# Patient Record
Sex: FEMALE | Race: WHITE
[De-identification: names, ages, dates, MRNs, and addresses within clinical notes are randomized per-mention and may not be internally consistent; named-entity substitution may affect disease eponyms.]

---

## 2018-12-21 ENCOUNTER — HOSPITAL ENCOUNTER (OUTPATIENT)
Dept: HOSPITAL 75 - RAD | Age: 58
End: 2018-12-21
Attending: OBSTETRICS & GYNECOLOGY
Payer: MEDICAID

## 2018-12-21 DIAGNOSIS — R10.2: ICD-10-CM

## 2018-12-21 DIAGNOSIS — Z90.710: ICD-10-CM

## 2018-12-21 DIAGNOSIS — G89.29: Primary | ICD-10-CM

## 2018-12-21 PROCEDURE — 76856 US EXAM PELVIC COMPLETE: CPT

## 2018-12-21 PROCEDURE — 76830 TRANSVAGINAL US NON-OB: CPT

## 2018-12-21 NOTE — DIAGNOSTIC IMAGING REPORT
PROCEDURE: US Non-ob pelvis comp/trans.



TECHNIQUE:  Multiple realtime grayscale images were obtained of

the pelvis in various projections endovaginally.



Transabdominal imaging was also performed.



INDICATION: Chronic pelvic pain. Patient status post

hysterectomy.



FINDINGS: The uterus is surgically absent. Neither ovary was

visualized. No adnexal mass or free fluid is identified.



IMPRESSION: Status post hysterectomy. The ovaries could not be

visualized. No adnexal mass is detected.



Dictated by: 



  Dictated on workstation # [id number]

## 2019-01-29 ENCOUNTER — HOSPITAL ENCOUNTER (OUTPATIENT)
Dept: HOSPITAL 75 - RAD | Age: 59
End: 2019-01-29
Attending: NURSE PRACTITIONER
Payer: MEDICAID

## 2019-01-29 DIAGNOSIS — K57.30: Primary | ICD-10-CM

## 2019-01-29 DIAGNOSIS — R16.0: ICD-10-CM

## 2019-01-29 DIAGNOSIS — Z90.49: ICD-10-CM

## 2019-01-29 PROCEDURE — 74160 CT ABDOMEN W/CONTRAST: CPT

## 2019-01-29 NOTE — DIAGNOSTIC IMAGING REPORT
PROCEDURE: CT abdomen with contrast only.



TECHNIQUE: Multiple contiguous axial images were obtained through

the abdomen after the administration of intravenous contrast.



INDICATION:  Epigastric pain.



COMPARISON: There are no prior studies available for comparison. 



FINDINGS: The stomach is filled with oral contrast and the

gastric antrum is distended by gas. There is no obvious gastric

abnormality evident. 



The liver is borderline enlarged and of lower density than

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 There is no

focal mass involving the liver and the biliary tree is not

abnormally dilated. The gallbladder is surgically absent.



The spleen, pancreas, adrenals, kidneys, aorta and inferior vena

cava show no sign of an acute abnormality.



There are a few fluid and gas-filled segments of small bowel in

the left midabdomen. This appearance is nonspecific. There is no

sign of a bowel obstruction. 



There is diverticulosis of the descending colon but there is no

clear evidence for acute diverticulitis. However, if there is

clinical concern regarding diverticulosis of the sigmoid colon,

CT of the pelvis should be obtained for further study. 



The bone windows show no evidence for fracture or for destructive

lesion.



IMPRESSION:

1. There is no acute abnormality of the abdomen.

2. There is diverticulosis of the descending colon but there is

no sign of acute diverticulitis. However, if there is clinical

concern regarding diverticulitis of the sigmoid colon, then CT of

the pelvis would be recommended for further study.

3. There is borderline hepatomegaly and the appearance of the

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

4. The gallbladder is surgically absent. 















Dictated by: 



  Dictated on workstation # [id number]

## 2019-02-26 ENCOUNTER — HOSPITAL ENCOUNTER (OUTPATIENT)
Dept: HOSPITAL 75 - CARD | Age: 59
End: 2019-02-26
Attending: NURSE PRACTITIONER
Payer: MEDICAID

## 2019-02-26 VITALS — SYSTOLIC BLOOD PRESSURE: 180 MMHG | DIASTOLIC BLOOD PRESSURE: 95 MMHG

## 2019-02-26 VITALS — DIASTOLIC BLOOD PRESSURE: 98 MMHG | SYSTOLIC BLOOD PRESSURE: 177 MMHG

## 2019-02-26 DIAGNOSIS — R00.2: ICD-10-CM

## 2019-02-26 DIAGNOSIS — I10: ICD-10-CM

## 2019-02-26 DIAGNOSIS — R07.89: Primary | ICD-10-CM

## 2019-02-26 DIAGNOSIS — R06.09: ICD-10-CM

## 2019-02-26 PROCEDURE — 93306 TTE W/DOPPLER COMPLETE: CPT

## 2019-03-05 ENCOUNTER — HOSPITAL ENCOUNTER (OUTPATIENT)
Dept: HOSPITAL 75 - RAD | Age: 59
End: 2019-03-05
Attending: NURSE PRACTITIONER
Payer: MEDICAID

## 2019-03-05 DIAGNOSIS — I73.9: Primary | ICD-10-CM

## 2019-03-05 PROCEDURE — 93923 UPR/LXTR ART STDY 3+ LVLS: CPT

## 2019-03-12 ENCOUNTER — HOSPITAL ENCOUNTER (OUTPATIENT)
Dept: HOSPITAL 75 - CATH | Age: 59
Discharge: HOME | End: 2019-03-12
Attending: INTERNAL MEDICINE
Payer: MEDICAID

## 2019-03-12 VITALS — WEIGHT: 222 LBS | HEIGHT: 66 IN | BODY MASS INDEX: 35.68 KG/M2

## 2019-03-12 VITALS — DIASTOLIC BLOOD PRESSURE: 85 MMHG | SYSTOLIC BLOOD PRESSURE: 106 MMHG

## 2019-03-12 VITALS — DIASTOLIC BLOOD PRESSURE: 73 MMHG | SYSTOLIC BLOOD PRESSURE: 127 MMHG

## 2019-03-12 VITALS — SYSTOLIC BLOOD PRESSURE: 125 MMHG | DIASTOLIC BLOOD PRESSURE: 70 MMHG

## 2019-03-12 VITALS — DIASTOLIC BLOOD PRESSURE: 73 MMHG | SYSTOLIC BLOOD PRESSURE: 143 MMHG

## 2019-03-12 VITALS — DIASTOLIC BLOOD PRESSURE: 77 MMHG | SYSTOLIC BLOOD PRESSURE: 125 MMHG

## 2019-03-12 VITALS — DIASTOLIC BLOOD PRESSURE: 62 MMHG | SYSTOLIC BLOOD PRESSURE: 122 MMHG

## 2019-03-12 VITALS — SYSTOLIC BLOOD PRESSURE: 133 MMHG | DIASTOLIC BLOOD PRESSURE: 70 MMHG

## 2019-03-12 VITALS — DIASTOLIC BLOOD PRESSURE: 92 MMHG | SYSTOLIC BLOOD PRESSURE: 155 MMHG

## 2019-03-12 VITALS — DIASTOLIC BLOOD PRESSURE: 74 MMHG | SYSTOLIC BLOOD PRESSURE: 114 MMHG

## 2019-03-12 VITALS — DIASTOLIC BLOOD PRESSURE: 70 MMHG | SYSTOLIC BLOOD PRESSURE: 138 MMHG

## 2019-03-12 DIAGNOSIS — M79.605: ICD-10-CM

## 2019-03-12 DIAGNOSIS — E66.9: ICD-10-CM

## 2019-03-12 DIAGNOSIS — G47.33: ICD-10-CM

## 2019-03-12 DIAGNOSIS — R07.89: Primary | ICD-10-CM

## 2019-03-12 DIAGNOSIS — K44.9: ICD-10-CM

## 2019-03-12 DIAGNOSIS — Z79.82: ICD-10-CM

## 2019-03-12 DIAGNOSIS — K21.9: ICD-10-CM

## 2019-03-12 DIAGNOSIS — R94.39: ICD-10-CM

## 2019-03-12 DIAGNOSIS — I10: ICD-10-CM

## 2019-03-12 DIAGNOSIS — E11.9: ICD-10-CM

## 2019-03-12 DIAGNOSIS — E78.5: ICD-10-CM

## 2019-03-12 DIAGNOSIS — Z79.899: ICD-10-CM

## 2019-03-12 LAB
ALBUMIN SERPL-MCNC: 4.3 GM/DL (ref 3.2–4.5)
ALP SERPL-CCNC: 52 U/L (ref 40–136)
ALT SERPL-CCNC: 36 U/L (ref 0–55)
APTT BLD: 33 SEC (ref 24–35)
BILIRUB SERPL-MCNC: 0.6 MG/DL (ref 0.1–1)
BUN/CREAT SERPL: 17
CALCIUM SERPL-MCNC: 9.3 MG/DL (ref 8.5–10.1)
CHLORIDE SERPL-SCNC: 109 MMOL/L (ref 98–107)
CHOLEST SERPL-MCNC: 124 MG/DL (ref ?–200)
CO2 SERPL-SCNC: 23 MMOL/L (ref 21–32)
CREAT SERPL-MCNC: 0.99 MG/DL (ref 0.6–1.3)
ERYTHROCYTE [DISTWIDTH] IN BLOOD BY AUTOMATED COUNT: 13.5 % (ref 10–14.5)
GFR SERPLBLD BASED ON 1.73 SQ M-ARVRAT: 58 ML/MIN
GLUCOSE SERPL-MCNC: 108 MG/DL (ref 70–105)
HCT VFR BLD CALC: 41 % (ref 35–52)
HDLC SERPL-MCNC: 32 MG/DL (ref 40–60)
HGB BLD-MCNC: 13.9 G/DL (ref 11.5–16)
INR PPP: 1 (ref 0.8–1.4)
MCH RBC QN AUTO: 29 PG (ref 25–34)
MCHC RBC AUTO-ENTMCNC: 34 G/DL (ref 32–36)
MCV RBC AUTO: 86 FL (ref 80–99)
PLATELET # BLD: 297 10^3/UL (ref 130–400)
PMV BLD AUTO: 10.2 FL (ref 7.4–10.4)
POTASSIUM SERPL-SCNC: 4.1 MMOL/L (ref 3.6–5)
PROT SERPL-MCNC: 6.7 GM/DL (ref 6.4–8.2)
PROTHROMBIN TIME: 12.7 SEC (ref 12.2–14.7)
SODIUM SERPL-SCNC: 142 MMOL/L (ref 135–145)
TRIGL SERPL-MCNC: 122 MG/DL (ref ?–150)
VLDLC SERPL CALC-MCNC: 24 MG/DL (ref 5–40)
WBC # BLD AUTO: 7.4 10^3/UL (ref 4.3–11)

## 2019-03-12 PROCEDURE — 36415 COLL VENOUS BLD VENIPUNCTURE: CPT

## 2019-03-12 PROCEDURE — 80053 COMPREHEN METABOLIC PANEL: CPT

## 2019-03-12 PROCEDURE — 85610 PROTHROMBIN TIME: CPT

## 2019-03-12 PROCEDURE — 80061 LIPID PANEL: CPT

## 2019-03-12 PROCEDURE — 87081 CULTURE SCREEN ONLY: CPT

## 2019-03-12 PROCEDURE — 93458 L HRT ARTERY/VENTRICLE ANGIO: CPT

## 2019-03-12 PROCEDURE — 85730 THROMBOPLASTIN TIME PARTIAL: CPT

## 2019-03-12 PROCEDURE — 85027 COMPLETE CBC AUTOMATED: CPT

## 2019-03-12 PROCEDURE — 93005 ELECTROCARDIOGRAM TRACING: CPT

## 2019-03-12 NOTE — CARDIAC PROCEDURE NOTE-CS/ASA
Pre-Procedure Note


Pre-Op Procedure Note


H&P Reviewed


The H&P was reviewed, patient examined and no changes noted.


Date H&P Reviewed:  Mar 12, 2019


Time H&P Reviewed:  13:09





Conscious Sedation Pre-Proced


Time


13:09





ASA Score


3


For ASA 3 and 4: Consider anesthesia and medical clearance. Also, for 

patients with a history of failed moderate sedation consider anesthesia.

















Airway 


 


Lungs 


 


Heart 


 


 ASA score


 


 ASA 1: a normal healthy patient


 


 ASA 2:  a patient with a mild systemic disease (mid diabetes, controlled 

hypertension, obesity 


 


 ASA 3:  a patient with a severe systemic disease that limits activity  (angina

, COPD, prior Myocardial infarction)


 


 ASA 4:  a patient with an incapacitating disease that is a constant threat to 

life (CHF, renal failure)


 


 ASA 5:  a moribund patient not expected to survive 24 hrs.  (ruptured aneurysm)


 


 ASA 6:  a declared brain-dead patient whose organs are being harvested.


 


 For emergent operations, add the letter E after the classification











Mallampati Classification


Grade 2





Sedation Plan


Analgesia, Amnesia, Plan communicated to team members, Discussed options with 

patient/fam, Discussed risks with patient/fam


The patient is an appropriate candidate to undergo the planned procedure, 

sedation, and anesthesia.





The patient immediately re-assessed prior to indication.











JUJU KING MD FACP FAC CCDS Mar 12, 2019 13:09

## 2019-03-12 NOTE — DISCHARGE INST-POST CATH
Discharge Inst-CATH/EP


Post Cardiac Cath/EP D/C Inst


Follow Up/Plan


F/u with Dr Zepeda in 2-3 weeks


CARDIAC CATH DISCHARGE INSTRUCTIONS





*Hold Metformin for 48 hours post heart cath.





ACTIVITY





* Go Home directly and rest.


* Limit activity of the leg (or wrist if it was used) for 7 days including 

aerobics, swimming,


   jogging, bicycling, etc.


* Restrict stair-climbing for 7 days if possible, if not, climb up with your non

-cath leg, then


   bring together on the same step.


* Avoid lifting, pushing, pulling or excessive movement of the affected 

extremity for 7 days.


* Customary sexual activity may be resumed after 2 days-use caution not to use 

a position  


   that strains or causes pain to the affected extremity.


* No driving for 24 hours.


* NO SMOKING. 


* Avoid straining for bowel movements for 7 days.


* Gentle walking on level ground is allowed.


* Returning to work will depend on the type of procedure and the results. Your 

doctor will discuss


   this with you.





CALL YOUR DOCTOR FOR ANY OF THE FOLLOWING:





*If bleeding from the puncture site occurs- Apply gentle pressure to site with 

clean cloth and call


   your doctor or EMS.


* If a knot or lump forms under the skin, increases in size, or causes pain.


* If bruising appears to be worsening or moving further down your leg instead 

of disappearing.


* Temperature above 101 F.





CARE OF YOUR GROIN INCISION;





* Bruising or purple discoloration of the skin near the puncture site is common.


* You may shower only, no bathtub bathing for 5 days.  Be careful to avoid 

slipping as your


   leg may feel stiff.


* If a closure device was used on your femoral artery, please see the attached 

guide regarding


   care of the device and your leg.


* Leave the dressing on, until removed by office staff.





CARE OF YOUR WRIST INCISION;





* Bruising or purple discoloration of the skin near the puncture site is common.


* You may shower.


* DO NOT submerge wrist.


* Leave dressing on, until removed by office staff..











JUJU ZEPEDA MD [facility] CCDS Mar 12, 2019 13:31

## 2019-03-12 NOTE — DISCHARGE INST-POST CATH
Discharge Inst-CATH/EP


Post Cardiac Cath/EP D/C Inst


Follow Up/Plan


F/u with Dr Zepeda in 2-3 weeks


CARDIAC CATH DISCHARGE INSTRUCTIONS





*Hold Metformin for 48 hours post heart cath.





ACTIVITY





* Go Home directly and rest.


* Limit activity of the leg (or wrist if it was used) for 7 days including 

aerobics, swimming,


   jogging, bicycling, etc.


* Restrict stair-climbing for 7 days if possible, if not, climb up with your non

-cath leg, then


   bring together on the same step.


* Avoid lifting, pushing, pulling or excessive movement of the affected 

extremity for 7 days.


* Customary sexual activity may be resumed after 2 days-use caution not to use 

a position  


   that strains or causes pain to the affected extremity.


* No driving for 24 hours.


* NO SMOKING. 


* Avoid straining for bowel movements for 7 days.


* Gentle walking on level ground is allowed.


* Returning to work will depend on the type of procedure and the results. Your 

doctor will discuss


   this with you.





CALL YOUR DOCTOR FOR ANY OF THE FOLLOWING:





*If bleeding from the puncture site occurs- Apply gentle pressure to site with 

clean cloth and call


   your doctor or EMS.


* If a knot or lump forms under the skin, increases in size, or causes pain.


* If bruising appears to be worsening or moving further down your leg instead 

of disappearing.


* Temperature above 101 F.





CARE OF YOUR GROIN INCISION;





* Bruising or purple discoloration of the skin near the puncture site is common.


* You may shower only, no bathtub bathing for 5 days.  Be careful to avoid 

slipping as your


   leg may feel stiff.


* If a closure device was used on your femoral artery, please see the attached 

guide regarding


   care of the device and your leg.


* Leave the dressing on, until removed by office staff.





CARE OF YOUR WRIST INCISION;





* Bruising or purple discoloration of the skin near the puncture site is common.


* You may shower.


* DO NOT submerge wrist.


* Leave dressing on, until removed by office staff..











JUJU ZEPEDA MD [facility] CCDS Mar 12, 2019 13:31

## 2019-03-12 NOTE — DISCHARGE INST-CARDIOLOGY
Discharge Inst-Cardiac


Discharge Medications


Continued Medications:  


Aspirin (Aspir 81) 81 Mg Tablet.dr


81 MG PO DAILY, TAB





Cetirizine HCl (Zyrtec) 10 Mg Capsule


10 MG PO DAILY, CAP





Cholecalciferol (Vitamin D3) (Vitamin D3) 1,000 Unit Tablet


1000 UNIT PO DAILY, TAB





Hydrochlorothiazide (Hydrochlorothiazide) 25 Mg Tablet


12.5 MG PO DAILY, TAB





Lactobacillus Acidophilus (Probiotic) 1 Each Capsule


1 EACH PO HS, CAP





Latanoprost (Latanoprost) 2.5 Ml Drops


1 DROP OP OU, DROPS





Lisinopril (Lisinopril) 20 Mg Tablet


10 MG PO DAILY, TAB





Lovastatin (Lovastatin) 20 Mg Tablet


20 MG PO DAILY, TAB





Magnesium Oxide (Magnesium) 400 Mg Tablet


400 MG PO DAILY, TAB





Metoprolol Succinate (Metoprolol Succinate) 50 Mg Tab.er.24h


50 MG PO BID, TAB





Omeprazole (Omeprazole) 20 Mg Capsule.dr


20 MG PO DAILY, CAP











Orders-Post D/C & Referrals


Pneu Vac Indicated:  Yes











JUJU KING MD FACP FACC CCDS Mar 12, 2019 13:31

## 2019-03-12 NOTE — CARDIAC CATHETERIZATION
DATE OF SERVICE:  03/12/2019



CARDIAC CATHETERIZATION REPORT



The patient is a 58-year-old lady who has multiple coronary artery disease risk

factors and who has been having chest discomfort.  A myocardial perfusion

imaging was carried out, which was indicative of anterolateral ischemia. 

Accordingly, cardiac catheterization was recommended and informed consent was

obtained.



PROCEDURE:

She was brought to the cardiac catheterization laboratory in a fasting state. 

Right groin was prepared and draped in the usual sterile fashion.  Lidocaine 1%

local anesthesia.  Modified Seldinger technique was used to advance a 5-Sammarinese

sheath in the right femoral artery, 5-Sammarinese JL4 catheter for left coronary

angiography, 5-Sammarinese JR4 catheter for right coronary angiography, 5-Sammarinese

pigtail catheter was used for left heart catheterization and left ventricular

angiography.  Angiography of the right femoral artery was carried out through

the sheath.  Mynx was used to achieve hemostasis following sheath removal. 

Overall, she tolerated the procedure well.



HEMODYNAMICS:

Left ventricular end-diastolic pressure following coronary angiography was 16

mmHg.  There was no significant pressure gradient on pullback across the aortic

valve.  Ascending aortic pressure was 137/73 with a mean of 88 mmHg.



LEFT VENTRICULAR ANGIOGRAPHY:

Left ventricular angiography was carried out in the right anterior oblique

projection.  Global left ventricular systolic function was normal.  No regional

wall motion abnormality was seen.  Left ventricular ejection fraction was

approximately 65%.



CORONARY ANGIOGRAPHY:

Left main coronary artery, left anterior descending artery, left circumflex

artery, right coronary artery all appear to be angiographically normal.  No

significant coronary artery disease is seen.  Right coronary artery is dominant.



CONCLUSION:

1.  No angiographically significant coronary artery disease.

2.  Normal global left ventricular systolic function with an ejection fraction

of 65%.

3.  Mild elevation of left ventricular end-diastolic pressure.

4.  No significant mitral regurgitation is seen on this study.



DISCUSSION AND RECOMMENDATIONS:

Based on results of the study, it appears appropriate to continue a conservative

approach.  Risk factor modification has been reviewed.  Outpatient followup is

advised.





Job ID: [id number]

DocumentID: [id number]

Dictated Date:  03/12/2019 13:24:14

Transcription Date: 03/12/2019 19:11:39

Dictated By: JUJU KING MD, MA, FACP, FACC,

## 2019-06-27 ENCOUNTER — HOSPITAL ENCOUNTER (OUTPATIENT)
Dept: HOSPITAL 75 - RAD | Age: 59
End: 2019-06-27
Attending: NURSE PRACTITIONER
Payer: MEDICAID

## 2019-06-27 DIAGNOSIS — Z12.31: Primary | ICD-10-CM

## 2019-06-27 PROCEDURE — 77067 SCR MAMMO BI INCL CAD: CPT

## 2019-06-27 NOTE — DIAGNOSTIC IMAGING REPORT
INDICATION: Routine screening.



Comparison is made with prior mammogram from 06/15/2011.



2-D and 3-D bilateral screening mammography was performed with

CAD.



The current study was also evaluated with a Computer Aided

Detection (CAD) system. 3-D tomosynthesis was also performed and

reviewed.



Scattered fibroglandular densities are identified bilaterally.

Scattered benign calcifications are noted. Fibronodular

parenchymal pattern appears to be stable. No mass or malignant

appearing microcalcifications are seen. Axillae are unremarkable.



IMPRESSION: No mammographic features suspicious for malignancy

are identified.



ACR BI-RADS Category 2: Benign findings.

Result letter will be mailed to the patient.

Note: At least 10% of breast cancer is not imaged by mammography.



Dictated by: 



  Dictated on workstation # [id number]

## 2022-09-02 ENCOUNTER — HOSPITAL ENCOUNTER (EMERGENCY)
Dept: HOSPITAL 75 - ER | Age: 62
Discharge: HOME | End: 2022-09-02
Payer: MEDICAID

## 2022-09-02 VITALS — WEIGHT: 228.84 LBS | HEIGHT: 66.02 IN | BODY MASS INDEX: 36.78 KG/M2

## 2022-09-02 VITALS — SYSTOLIC BLOOD PRESSURE: 163 MMHG | DIASTOLIC BLOOD PRESSURE: 91 MMHG

## 2022-09-02 DIAGNOSIS — Z90.49: ICD-10-CM

## 2022-09-02 DIAGNOSIS — G47.30: ICD-10-CM

## 2022-09-02 DIAGNOSIS — Z99.89: ICD-10-CM

## 2022-09-02 DIAGNOSIS — K57.92: Primary | ICD-10-CM

## 2022-09-02 LAB
ALBUMIN SERPL-MCNC: 4.2 GM/DL (ref 3.2–4.5)
ALP SERPL-CCNC: 59 U/L (ref 40–136)
ALT SERPL-CCNC: 77 U/L (ref 0–55)
BASOPHILS # BLD AUTO: 0 10^3/UL (ref 0–0.1)
BASOPHILS NFR BLD AUTO: 0 % (ref 0–10)
BILIRUB SERPL-MCNC: 0.5 MG/DL (ref 0.1–1)
BUN/CREAT SERPL: 15
CALCIUM SERPL-MCNC: 10.2 MG/DL (ref 8.5–10.1)
CHLORIDE SERPL-SCNC: 108 MMOL/L (ref 98–107)
CO2 SERPL-SCNC: 19 MMOL/L (ref 21–32)
CREAT SERPL-MCNC: 1.08 MG/DL (ref 0.6–1.3)
EOSINOPHIL # BLD AUTO: 0.3 10^3/UL (ref 0–0.3)
EOSINOPHIL NFR BLD AUTO: 3 % (ref 0–10)
GFR SERPLBLD BASED ON 1.73 SQ M-ARVRAT: 58 ML/MIN
GLUCOSE SERPL-MCNC: 166 MG/DL (ref 70–105)
HCT VFR BLD CALC: 44 % (ref 35–52)
HGB BLD-MCNC: 15.2 G/DL (ref 11.5–16)
LYMPHOCYTES # BLD AUTO: 2.4 X 10^3 (ref 1–4)
LYMPHOCYTES NFR BLD AUTO: 24 % (ref 12–44)
MANUAL DIFFERENTIAL PERFORMED BLD QL: NO
MCH RBC QN AUTO: 31 PG (ref 25–34)
MCHC RBC AUTO-ENTMCNC: 35 G/DL (ref 32–36)
MCV RBC AUTO: 88 FL (ref 80–99)
MONOCYTES # BLD AUTO: 0.6 X 10^3 (ref 0–1)
MONOCYTES NFR BLD AUTO: 6 % (ref 0–12)
NEUTROPHILS # BLD AUTO: 6.7 X 10^3 (ref 1.8–7.8)
NEUTROPHILS NFR BLD AUTO: 67 % (ref 42–75)
PLATELET # BLD: 329 10^3/UL (ref 130–400)
PMV BLD AUTO: 11 FL (ref 9–12.2)
POTASSIUM SERPL-SCNC: 4.1 MMOL/L (ref 3.6–5)
PROT SERPL-MCNC: 7.2 GM/DL (ref 6.4–8.2)
SODIUM SERPL-SCNC: 139 MMOL/L (ref 135–145)
WBC # BLD AUTO: 10 10^3/UL (ref 4.3–11)

## 2022-09-02 PROCEDURE — 80053 COMPREHEN METABOLIC PANEL: CPT

## 2022-09-02 PROCEDURE — 74176 CT ABD & PELVIS W/O CONTRAST: CPT

## 2022-09-02 PROCEDURE — 85025 COMPLETE CBC W/AUTO DIFF WBC: CPT

## 2022-09-02 PROCEDURE — 36415 COLL VENOUS BLD VENIPUNCTURE: CPT

## 2022-09-02 NOTE — DIAGNOSTIC IMAGING REPORT
EXAMINATION: CT abdomen and pelvis without contrast, 09/02/2022.



TECHNIQUE: Multiple contiguous axial images were obtained through

the abdomen and pelvis without the use of intravenous contrast.

Auto Exposure Controls were utilized during the CT exam to meet

ALARA standards for radiation dose reduction. 



INDICATION: Diarrhea for the last five days. Abdominal pain,

history of diverticulitis.



COMPARISON: 01/29/2019.



FINDINGS:



There is bibasilar dependent scarring or atelectasis within the

lung bases. Within the abdomen and pelvis, the nonopacified

abdominal viscera demonstrate hepatic steatosis. There is

evidence of previous cholecystectomy. There is a separate clip to

the right of the right lobe of the liver. The spleen is normal.

The pancreas appears unremarkable. Adrenal glands are normal.

There is deformity and associated calcification along the mid

left kidney perhaps due to a prior infectious etiology. A renal

stone is not excluded. No hydronephrosis is seen. The right

kidney is unremarkable. No ureteral stones appreciated.



There is diffuse diverticular disease throughout the left colon

and sigmoid. Mild focal inflammatory change is seen about the

distal sigmoid colon consistent with diverticulitis. There is no

adjacent evidence for perforation or abscess formation. No free

fluid. No free air. A linear hyperdensity anterior to the distal

sigmoid is likely a postoperative clip. Hyperdensities associated

with the right ovary are likely calcifications. Appendix is not

seen, but there is no focal inflammatory change in the right

lower quadrant. Additional clips are noted in the anterior right

lower quadrant. Mild atherosclerotic disease is seen. Osseous

structures are unremarkable.



IMPRESSION:

1. Findings of acute diverticulitis along the sigmoid colon with

no evidence for perforation or abscess formation.

2. Hepatic steatosis.

3. Multiple clips throughout the abdomen; correlate with surgical

history as some are randomly located throughout the abdomen and

pelvis.



Dictated by: 



  Dictated on workstation # [id number]

## 2022-09-02 NOTE — ED ABDOMINAL PAIN
General


Chief Complaint:  Abdominal/GI Problems


Stated Complaint:  ABDOMINAL PAIN


Nursing Triage Note:  


PT AMB TO [facility] 9 WITH . PT STATED THAT SHE HAS HAD DIARRHEA THE LAST 5 DAYS




WITH ABD PAIN. PT HAS HX OF DIVERTICULITIS.


Source of Information:  Patient


Exam Limitations:  No Limitations





History of Present Illness


Date Seen by Provider:  Sep 2, 2022


Time Seen by Provider:  18:03


Initial Comments


ER with suprapubic abdominal pain that started last week, got better then got 

worse.  She is had diarrhea, mild nausea no fever no chills


Timing/Duration:  1 Week


Severity/Quality:  Moderate


Location:  Suprapubic


Radiation:  No Radiation


Activities at Onset:  None


Associated Symptoms:  Denies Symptoms





Allergies and Home Medications


Allergies


Coded Allergies:  


     amoxicillin (Verified  Allergy, Unknown, 3/12/19)


     celecoxib (Verified  Allergy, Unknown, 3/12/19)


     codeine (Verified  Allergy, Unknown, 3/12/19)


     diclofenac (Verified  Allergy, Unknown, 3/12/19)


     prochlorperazine (Verified  Allergy, Unknown, 3/12/19)


     tramadol (Verified  Allergy, Unknown, 3/12/19)





Patient Home Medication List


Home Medication List Reviewed:  Yes


Aspirin (Aspir 81) 81 Mg Tablet.dr, 81 MG PO DAILY, (Reported)


   Entered as Reported by: LUC ALDANA on 3/12/19 0924


Cetirizine HCl (Zyrtec) 10 Mg Capsule, 10 MG PO DAILY, (Reported)


   Entered as Reported by: LUC ALDANA on 3/12/19 0927


Cholecalciferol (Vitamin D3) (Vitamin D3) 1,000 Unit Tablet, 1,000 UNIT PO 

DAILY, (Reported)


   Entered as Reported by: LUC ALDANA on 3/12/19 0928


Hydrochlorothiazide (Hydrochlorothiazide) 25 Mg Tablet, 12.5 MG PO DAILY, 

(Reported)


   Entered as Reported by: LUC ALDANA on 3/12/19 0927


Lactobacillus Acidophilus (Probiotic) 1 Each Capsule, 1 EACH PO HS, (Reported)


   Entered as Reported by: LUC ALDANA on 3/12/19 0928


Latanoprost (Latanoprost) 2.5 Ml Drops, 1 DROP OP OU, (Reported)


   Entered as Reported by: LUC ALDANA on 3/12/19 0932


Lisinopril (Lisinopril) 20 Mg Tablet, 10 MG PO DAILY, (Reported)


   Entered as Reported by: LUC ALDANA on 3/12/19 0925


Lovastatin (Lovastatin) 20 Mg Tablet, 20 MG PO DAILY, (Reported)


   Entered as Reported by: LUC ALDANA on 3/12/19 0926


Magnesium Oxide (Magnesium) 400 Mg Tablet, 400 MG PO DAILY, (Reported)


   Entered as Reported by: LUC ALDANA on 3/12/19 0924


Metoprolol Succinate (Metoprolol Succinate) 50 Mg Tab.er.24h, 50 MG PO BID, 

(Reported)


   Entered as Reported by: LUC ALDANA on 3/12/19 0926


Omeprazole (Omeprazole) 20 Mg Capsule.dr, 20 MG PO DAILY, (Reported)


   Entered as Reported by: LUC ALDANA on 3/12/19 0925





Review of Systems


Review of Systems


Constitutional:  see HPI


EENTM:  No Symptoms Reported


Respiratory:  No Symptoms Reported


Cardiovascular:  No Symptoms Reported


Gastrointestinal:  See HPI, Abdominal Pain, Diarrhea


Genitourinary:  No Symptoms Reported


Skin:  no symptoms reported


Psychiatric/Neurological:  No Symptoms Reported


Endocrine:  No Symptoms Reported


Hematologic/Lymphatic:  No Symptoms Reported





Past Medical-Social-Family Hx


Patient Social History


Tobacco Use?:  No


Substance use?:  No


Alcohol Use?:  No





Immunizations Up To Date


Influenza Vaccine Up-to-Date:  No; Not Current





Past Medical History


Gallbladder, Hysterectomy, Tubal Ligation


Respiratory:  Yes


Sleep Apnea


Currently Using CPAP:  Yes


Cardiac:  Yes


High Cholesterol, Hypertension, Palpitations


Neurological:  Yes (CONVULSIONS AS A CHILD)


Genitourinary:  Yes (DOCTOR IS "WATCHING" KIDNEYS AND LIVER)


Gastrointestinal:  Yes


Diverticulosis, Ulcer


Cancer:  No





Physical Exam


Vital Signs





Vital Signs - First Documented








 9/2/22





 16:35


 


Pulse 68


 


Resp 16


 


B/P (MAP) 153/82 (105)


 


Pulse Ox 95


 


O2 Delivery Room Air





Capillary Refill : Less Than 3 Seconds


Height/Weight/BMI


Height: 5'6.00"


Weight: 222lbs. 0.0oz. 100.838002ne; 36.00 BMI


Method:


General Appearance:  WD/WN, no apparent distress


Respiratory:  no respiratory distress, no accessory muscle use


Cardiovascular:  regular rate, rhythm, no murmur


Gastrointestinal:  normal bowel sounds, soft, tenderness (Mild suprapubic)


Neurologic/Psychiatric:  alert, normal mood/affect, oriented x 3


Skin:  normal color, warm/dry





Progress/Results/Core Measures


Results/Orders


Lab Results





Laboratory Tests








Test


 9/2/22


16:52 Range/Units


 


 


White Blood Count


 10.0 


 4.3-11.0


10^3/uL


 


Red Blood Count


 4.98 


 3.80-5.11


10^6/uL


 


Hemoglobin 15.2  11.5-16.0  g/dL


 


Hematocrit 44  35-52  %


 


Mean Corpuscular Volume 88  80-99  fL


 


Mean Corpuscular Hemoglobin 31  25-34  pg


 


Mean Corpuscular Hemoglobin


Concent 35 


 32-36  g/dL





 


Red Cell Distribution Width 13.6  10.0-14.5  %


 


Platelet Count


 329 


 130-400


10^3/uL


 


Mean Platelet Volume 11.0  9.0-12.2  fL


 


Immature Granulocyte % (Auto) 0   %


 


Neutrophils (%) (Auto) 67  42-75  %


 


Lymphocytes (%) (Auto) 24  12-44  %


 


Monocytes (%) (Auto) 6  0-12  %


 


Eosinophils (%) (Auto) 3  0-10  %


 


Basophils (%) (Auto) 0  0-10  %


 


Neutrophils # (Auto) 6.7  1.8-7.8  X 10^3


 


Lymphocytes # (Auto) 2.4  1.0-4.0  X 10^3


 


Monocytes # (Auto) 0.6  0.0-1.0  X 10^3


 


Eosinophils # (Auto)


 0.3 


 0.0-0.3


10^3/uL


 


Basophils # (Auto)


 0.0 


 0.0-0.1


10^3/uL


 


Immature Granulocyte # (Auto)


 0.0 


 0.0-0.1


10^3/uL


 


Sodium Level 139  135-145  MMOL/L


 


Potassium Level 4.1  3.6-5.0  MMOL/L


 


Chloride Level 108 H   MMOL/L


 


Carbon Dioxide Level 19 L 21-32  MMOL/L


 


Anion Gap 12  5-14  MMOL/L


 


Blood Urea Nitrogen 16  7-18  MG/DL


 


Creatinine


 1.08 


 0.60-1.30


MG/DL


 


Estimat Glomerular Filtration


Rate 58 


  





 


BUN/Creatinine Ratio 15   


 


Glucose Level 166 H   MG/DL


 


Calcium Level 10.2 H 8.5-10.1  MG/DL


 


Corrected Calcium 10.0  8.5-10.1  MG/DL


 


Total Bilirubin 0.5  0.1-1.0  MG/DL


 


Aspartate Amino Transf


(AST/SGOT) 68 H


 5-34  U/L





 


Alanine Aminotransferase


(ALT/SGPT) 77 H


 0-55  U/L





 


Alkaline Phosphatase 59    U/L


 


Total Protein 7.2  6.4-8.2  GM/DL


 


Albumin 4.2  3.2-4.5  GM/DL








My Orders





Orders - ELLEN BARAKAT


Cbc With Automated Diff (9/2/22 17:13)


Comprehensive Metabolic Panel (9/2/22 17:13)


Ed Iv/Invasive Line Start (9/2/22 17:13)


Ct Abdomen/Pelvis Wo (9/2/22 17:13)


Ns Iv 1000 Ml (Sodium Chloride 0.9%) (9/2/22 17:15)


Fentanyl  Inj (Sublimaze Injection) (9/2/22 17:15)





Medications Given in ED





Current Medications








 Medications  Dose


 Ordered  Sig/Nakia


 Route  Start Time


 Stop Time Status Last Admin


Dose Admin


 


 Fentanyl Citrate  50 mcg  ONCE  ONCE


 IVP  9/2/22 17:15


 9/2/22 17:16 DC 9/2/22 17:29


50 MCG








Vital Signs/I&O











 9/2/22





 16:35


 


Pulse 68


 


Resp 16


 


B/P (MAP) 153/82 (105)


 


Pulse Ox 95


 


O2 Delivery Room Air














Blood Pressure Mean:                    105











Departure


Impression





   Primary Impression:  


   Diverticulitis of intestine


Disposition:  01 HOME, SELF-CARE


Condition:  Stable





Departure-Patient Inst.


Decision time for Depature:  18:05


Referrals:  


[facility]


Primary Care Physician








CARYL CA (Family)


Primary Care Physician


Patient Instructions:  Diverticulitis





Add. Discharge Instructions:  


1.  Return to ER for any concerns.  Follow-up with your family doctor next week.





All discharge instructions reviewed with patient and/or family. Voiced 

understanding.


Scripts


Metronidazole (Metronidazole) 500 Mg Tablet


500 MG PO TID, #15 TAB 0 Refills


   Prov: ELLEN BARAKAT         9/2/22 


Ciprofloxacin HCl (Ciprofloxacin HCl) 500 Mg Tablet


500 MG PO BID, #10 TAB


   Prov: ELLEN BARAKAT APRN         9/2/22











ELLEN BARAKAT              Sep 2, 2022 17:41